# Patient Record
Sex: FEMALE | Race: WHITE | Employment: OTHER | URBAN - METROPOLITAN AREA
[De-identification: names, ages, dates, MRNs, and addresses within clinical notes are randomized per-mention and may not be internally consistent; named-entity substitution may affect disease eponyms.]

---

## 2017-02-06 DIAGNOSIS — E78.2 MIXED HYPERLIPIDEMIA: Primary | ICD-10-CM

## 2017-02-06 NOTE — TELEPHONE ENCOUNTER
Pravastatin     Last Written Prescription Date: 11/24/15  Last Fill Quantity: 90, # refills: 3  Last Office Visit with G, P or [facility] prescribing provider: 09/15/16       CHOL      176   9/15/2016  HDL       66   9/15/2016  LDL       96   9/15/2016  TRIG       71   9/15/2016  CHOLHDLRATIO      3.2   9/3/2015    Labs showing if normal/abnormal  Lab Results   Component Value Date    CHOL 176 09/15/2016    TRIG 71 09/15/2016    HDL 66 09/15/2016    LDL 96 09/15/2016    VLDL 15 09/03/2015    CHOLHDLRATIO 3.2 09/03/2015

## 2017-02-08 RX ORDER — PRAVASTATIN SODIUM 10 MG
10 TABLET ORAL DAILY
Qty: 90 TABLET | Refills: 2 | Status: SHIPPED | OUTPATIENT
Start: 2017-02-08 | End: 2017-10-25

## 2017-08-07 ENCOUNTER — HOSPITAL ENCOUNTER (OUTPATIENT)
Dept: MAMMOGRAPHY | Facility: CLINIC | Age: 67
Discharge: HOME | End: 2017-08-07
Attending: INTERNAL MEDICINE | Admitting: INTERNAL MEDICINE
Payer: MEDICARE

## 2017-08-07 DIAGNOSIS — Z12.31 VISIT FOR SCREENING MAMMOGRAM: ICD-10-CM

## 2017-08-07 PROCEDURE — 77063 BREAST TOMOSYNTHESIS BI: CPT

## 2017-08-11 ENCOUNTER — OFFICE VISIT (OUTPATIENT)
Dept: OBGYN | Facility: CLINIC | Age: 67
End: 2017-08-11
Payer: MEDICARE

## 2017-08-11 VITALS
HEIGHT: 65 IN | BODY MASS INDEX: 22.46 KG/M2 | SYSTOLIC BLOOD PRESSURE: 114 MMHG | WEIGHT: 134.8 LBS | DIASTOLIC BLOOD PRESSURE: 70 MMHG | HEART RATE: 68 BPM

## 2017-08-11 DIAGNOSIS — Z01.419 ENCOUNTER FOR GYNECOLOGICAL EXAMINATION WITHOUT ABNORMAL FINDING: Primary | ICD-10-CM

## 2017-08-11 PROCEDURE — G0101 CA SCREEN;PELVIC/BREAST EXAM: HCPCS | Performed by: OBSTETRICS & GYNECOLOGY

## 2017-08-11 ASSESSMENT — PATIENT HEALTH QUESTIONNAIRE - PHQ9
SUM OF ALL RESPONSES TO PHQ QUESTIONS 1-9: 0
5. POOR APPETITE OR OVEREATING: NOT AT ALL

## 2017-08-11 ASSESSMENT — ANXIETY QUESTIONNAIRES
2. NOT BEING ABLE TO STOP OR CONTROL WORRYING: NOT AT ALL
GAD7 TOTAL SCORE: 0
5. BEING SO RESTLESS THAT IT IS HARD TO SIT STILL: NOT AT ALL
7. FEELING AFRAID AS IF SOMETHING AWFUL MIGHT HAPPEN: NOT AT ALL
6. BECOMING EASILY ANNOYED OR IRRITABLE: NOT AT ALL
3. WORRYING TOO MUCH ABOUT DIFFERENT THINGS: NOT AT ALL
1. FEELING NERVOUS, ANXIOUS, OR ON EDGE: NOT AT ALL

## 2017-08-11 NOTE — PROGRESS NOTES
Skye is a 67 year old  female who presents for Medicare Limited exam.     Do you have a Health Care Directive?: Yes: Patient states has Advance Directive and will bring in a copy to clinic.    Fall risk:   Fallen 2 or more times in the past year?: No  Any fall with injury in the past year?: No      HPI :  Sees Chuy Bangura for PCP.  No GYN issues    GYNECOLOGIC HISTORY:Patient's last menstrual period was 2010..   reports that she has never smoked. She has never used smokeless tobacco.      STD testing offered?  Declined  Last PHQ-9 score on record=   PHQ-9 SCORE 2017   Total Score 0     Last GAD7 score on record=   JITENDRA-7 SCORE 2013   Total Score 15 0 -   Total Score - - 0       HEALTH MAINTENANCE:  Cholesterol: 9/15/16   Total= 176, Triglycerides=71, HDL=66, LDL=96, RUH=267, TSH=1.44 -had done elsewhere  Last Mammo: 17 @ [facility], Result: normal, Next Mammo: 2018   Pap: 13 neg, HPV-  DEXA: 07. States medicare does not cover  Colonoscopy:  08, Result:  normal, Next Colonoscopy: due next year    HISTORY:  Obstetric History       T0      L0     SAB0   TAB0   Ectopic0   Multiple0   Live Births0         Past Medical History:   Diagnosis Date     Arthritis      DUB (dysfunctional uterine bleeding)     Has disordered proliferative endometrium on bx.     Essential hypertension, benign      Hyperlipidemia      Hypertension 1\1\     Menorrhagia      Palpitations      PONV (postoperative nausea and vomiting)      TIA (transient ischaemic attack) 2013     Vertigo      Past Surgical History:   Procedure Laterality Date     C NONSPECIFIC PROCEDURE      D + C several times for uterine polyp     COLONOSCOPY       GYN SURGERY      D+C     GYN SURGERY       ORTHOPEDIC SURGERY       OSTEOTOMY FOOT  2012    Procedure: OSTEOTOMY FOOT;  LEFT DISTAL FIRST METATARSAL OSTEOTOMY WITH LISA REPAIR, LEFT SECOND METATARSAL WEIL OSTEOTOMY, LEFT SECOND  HAMMERTOE CORRECTION;  Surgeon: Henry Morelos DPM;  Location: [facility]     REPAIR HAMMER TOE  2012    Procedure: REPAIR HAMMER TOE;  LEFT SECOND HAMMER TOE CORRECTION;  Surgeon: Henry Morelos DPM;  Location: [facility]     Family History   Problem Relation Age of Onset     Hyperlipidemia Mother      CEREBROVASCULAR DISEASE Mother       81 yo 5 strokes and possible MI     Fractures Mother      HIP     Hypertension Mother      Thyroid Disease Mother      HEART DISEASE Father       age 87. MI age 55 and bladder cancer     CANCER Father      bladder cancer     Other Cancer Father      Bladder     CANCER Sister       51 yo- breast cancer     Breast Cancer Sister      Uterine Cancer Other      Social History     Social History     Marital status:      Spouse name: Olegario     Number of children: 0     Years of education: N/A     Occupational History      Retired     Social History Main Topics     Smoking status: Never Smoker     Smokeless tobacco: Never Used      Comment: smoked for 8 months     Alcohol use 0.0 oz/week     0 Standard drinks or equivalent per week      Comment: one mixed drink daily     Drug use: No     Sexual activity: Yes     Partners: Male     Other Topics Concern     Parent/Sibling W/ Cabg, Mi Or Angioplasty Before 65f 55m? Yes     Father 54?     Social History Narrative    Walks about 45-60 minutes, nearly every day.      Current Outpatient Prescriptions   Medication Sig     pravastatin (PRAVACHOL) 10 MG tablet Take 1 tablet (10 mg) by mouth daily     hydrochlorothiazide (HYDRODIURIL) 25 MG tablet Take 0.5 tablets (12.5 mg) by mouth daily     amLODIPine (NORVASC) 5 MG tablet Take 1 tablet (5 mg) by mouth daily     ondansetron (ZOFRAN) 4 MG tablet Take 1-2 tablets (4-8 mg) by mouth every 8 hours as needed for nausea     aspirin 81 MG tablet Take 1 tablet (81 mg) by mouth daily     meclizine (ANTIVERT) 25 MG tablet Take 1 tablet (25 mg) by mouth every 6 hours as  "needed     No current facility-administered medications for this visit.      No Known Allergies    Past medical, surgical, social and family history were reviewed and updated in [facility].    EXAM:  /70  Pulse 68  Ht 5' 5\" (1.651 m)  Wt 134 lb 12.8 oz (61.1 kg)  LMP 08/25/2010  BMI 22.43 kg/m2   BMI: Body mass index is 22.43 kg/(m^2).    Constitutional: Appearance: Well nourished, well developed alert, in no acute distress  Breasts: Inspection of Breasts:  No lymphadenopathy present    Palpation of Breasts and Axillae:  No masses present on palpation, no  breast tenderness    Axillary Lymph Nodes:  No lymphadenopathy present  Neurologic/Psychiatric:    Mental Status:  Oriented X3     Pelvic Exam:  External Genitalia:     Normal appearance for age, no discharge present, no tenderness present, no inflammatory lesions present, color normal  Vagina:     Normal vaginal vault without central or paravaginal defects, ATROPHIC  Bladder:     Nontender to palpation  Urethra:   Urethral Body:  Urethra palpation normal, urethra structural support normal   Urethral Meatus:  No erythema or lesions present  Cervix:     Appearance healthy, no lesions present, nontender to palpation, no bleeding present  Uterus:     Nontender to palpation, no masses present, position anteflexed, mobility: normal  Adnexa:     No adnexal tenderness present, no adnexal masses present  Perineum:     Perineum within normal limits, no evidence of trauma, no rashes or skin lesions present  Inguinal Lymph Nodes:     No lymphadenopathy present      Body mass index is 22.43 kg/(m^2).     reports that she has never smoked. She has never used smokeless tobacco.        ASSESSMENT:  67 year old female with satisfactory annual exam.  ICD-10-CM    1. Encounter for gynecological examination without abnormal finding Z01.419 Medicare Limited Visit       COUNSELING:   Reviewed preventive health counseling, as reflected in patient instructions       Regular " exercise    PLAN/PATIENT INSTRUCTIONS:  RTC 2 years    Clay Lange MD

## 2017-08-11 NOTE — MR AVS SNAPSHOT
After Visit Summary   8/11/2017    Skye Hewitt    MRN: [id number]           Patient Information     Date Of Birth          1950        Visit Information        Provider Department      8/11/2017 10:00 AM Clay Lange MD [facility]        Today's Diagnoses     Encounter for gynecological examination without abnormal finding    -  1       Follow-ups after your visit        Your next 10 appointments already scheduled     Oct 25, 2017  8:20 AM CDT   PHYSICAL with Chuy Bangura MD   [facility] ([facility])    [address]  [facility] [id number] [phone]              Who to contact     If you have questions or need follow up information about today's clinic visit or your schedule please contact [facility] directly at [phone].  Normal or non-critical lab and imaging results will be communicated to you by MyChart, letter or phone within 4 business days after the clinic has received the results. If you do not hear from us within 7 days, please contact the clinic through MyChart or phone. If you have a critical or abnormal lab result, we will notify you by phone as soon as possible.  Submit refill requests through [employer] or call your pharmacy and they will forward the refill request to us. Please allow 3 business days for your refill to be completed.          Additional Information About Your Visit        MyChart Information     [employer]aclip gives you secure access to your electronic health record. If you see a primary care provider, you can also send messages to your care team and make appointments. If you have questions, please call your primary care clinic.  If you do not have a primary care provider, please call [phone] and they will assist you.        Care EveryWhere ID     This is your Care EveryWhere ID. This could be used by other organizations to access your [facility]  "records  [id number]        Your Vitals Were     Pulse Height Last Period BMI (Body Mass Index)          68 5' 5\" (1.651 m) 08/25/2010 22.43 kg/m2         Blood Pressure from Last 3 Encounters:   08/11/17 114/70   09/15/16 134/64   06/14/16 120/80    Weight from Last 3 Encounters:   08/11/17 134 lb 12.8 oz (61.1 kg)   09/15/16 143 lb (64.9 kg)   06/14/16 143 lb (64.9 kg)              We Performed the Following     Medicare Limited Visit        Primary Care Provider Office Phone # Fax #    Chuy Bangura [phone]       [address] [place], [place] [address]        Equal Access to Services     STEF GARG : Hadii prem lynn hadasho Soomaali, waaxda luqadaha, qaybta kaalmada adeegyada, margi mathewin hayrichie parada . So [facility] [phone].    ATENCIÓN: Si habla español, tiene a waller disposición servicios gratuitos de asistencia lingüística. Llame al [phone].    We comply with applicable federal civil rights laws and [place] laws. We do not discriminate on the basis of race, color, national origin, age, disability sex, sexual orientation or gender identity.            Thank you!     Thank you for choosing [facility]  for your care. Our goal is always to provide you with excellent care. Hearing back from our patients is one way we can continue to improve our services. Please take a few minutes to complete the written survey that you may receive in the mail after your visit with us. Thank you!             Your Updated Medication List - Protect others around you: Learn how to safely use, store and throw away your medicines at www.disposemymeds.org.          This list is accurate as of: 8/11/17 10:23 AM.  Always use your most recent med list.                   Brand Name Dispense Instructions for use Diagnosis    amLODIPine 5 MG tablet    NORVASC    90 tablet    Take 1 tablet (5 mg) by mouth daily    Essential hypertension, benign       aspirin 81 MG tablet     30 " tablet    Take 1 tablet (81 mg) by mouth daily    Essential hypertension, benign       hydrochlorothiazide 25 MG tablet    HYDRODIURIL    90 tablet    Take 0.5 tablets (12.5 mg) by mouth daily    Essential hypertension, benign       meclizine 25 MG tablet    ANTIVERT    30 tablet    Take 1 tablet (25 mg) by mouth every 6 hours as needed    Vertigo       ondansetron 4 MG tablet    ZOFRAN    18 tablet    Take 1-2 tablets (4-8 mg) by mouth every 8 hours as needed for nausea    Nausea       pravastatin 10 MG tablet    PRAVACHOL    90 tablet    Take 1 tablet (10 mg) by mouth daily    Mixed hyperlipidemia

## 2017-08-12 ASSESSMENT — ANXIETY QUESTIONNAIRES: GAD7 TOTAL SCORE: 0

## 2017-10-25 ENCOUNTER — OFFICE VISIT (OUTPATIENT)
Dept: INTERNAL MEDICINE | Facility: CLINIC | Age: 67
End: 2017-10-25
Payer: MEDICARE

## 2017-10-25 VITALS
TEMPERATURE: 97.5 F | BODY MASS INDEX: 22.66 KG/M2 | SYSTOLIC BLOOD PRESSURE: 118 MMHG | OXYGEN SATURATION: 99 % | HEIGHT: 65 IN | DIASTOLIC BLOOD PRESSURE: 74 MMHG | HEART RATE: 76 BPM | WEIGHT: 136 LBS

## 2017-10-25 DIAGNOSIS — I10 ESSENTIAL HYPERTENSION, BENIGN: ICD-10-CM

## 2017-10-25 DIAGNOSIS — H69.90 DYSFUNCTION OF EUSTACHIAN TUBE, UNSPECIFIED LATERALITY: ICD-10-CM

## 2017-10-25 DIAGNOSIS — H91.93 BILATERAL HEARING LOSS, UNSPECIFIED HEARING LOSS TYPE: ICD-10-CM

## 2017-10-25 DIAGNOSIS — E78.5 HYPERLIPIDEMIA LDL GOAL <70: ICD-10-CM

## 2017-10-25 DIAGNOSIS — E78.2 MIXED HYPERLIPIDEMIA: ICD-10-CM

## 2017-10-25 DIAGNOSIS — L82.1 SEBORRHEIC KERATOSES: ICD-10-CM

## 2017-10-25 DIAGNOSIS — Z00.00 ROUTINE GENERAL MEDICAL EXAMINATION AT A HEALTH CARE FACILITY: Primary | ICD-10-CM

## 2017-10-25 LAB
ALBUMIN SERPL-MCNC: 4 G/DL (ref 3.4–5)
ALP SERPL-CCNC: 57 U/L (ref 40–150)
ALT SERPL W P-5'-P-CCNC: 20 U/L (ref 0–50)
ANION GAP SERPL CALCULATED.3IONS-SCNC: 9 MMOL/L (ref 3–14)
AST SERPL W P-5'-P-CCNC: 16 U/L (ref 0–45)
BILIRUB SERPL-MCNC: 0.5 MG/DL (ref 0.2–1.3)
BUN SERPL-MCNC: 18 MG/DL (ref 7–30)
CALCIUM SERPL-MCNC: 9.6 MG/DL (ref 8.5–10.1)
CHLORIDE SERPL-SCNC: 103 MMOL/L (ref 94–109)
CHOLEST SERPL-MCNC: 197 MG/DL
CO2 SERPL-SCNC: 27 MMOL/L (ref 20–32)
CREAT SERPL-MCNC: 0.87 MG/DL (ref 0.52–1.04)
ERYTHROCYTE [DISTWIDTH] IN BLOOD BY AUTOMATED COUNT: 12.7 % (ref 10–15)
GFR SERPL CREATININE-BSD FRML MDRD: 65 ML/MIN/1.7M2
GLUCOSE SERPL-MCNC: 115 MG/DL (ref 70–99)
HCT VFR BLD AUTO: 42.9 % (ref 35–47)
HDLC SERPL-MCNC: 88 MG/DL
HGB BLD-MCNC: 14.4 G/DL (ref 11.7–15.7)
LDLC SERPL CALC-MCNC: 96 MG/DL
MCH RBC QN AUTO: 30.6 PG (ref 26.5–33)
MCHC RBC AUTO-ENTMCNC: 33.6 G/DL (ref 31.5–36.5)
MCV RBC AUTO: 91 FL (ref 78–100)
NONHDLC SERPL-MCNC: 109 MG/DL
PLATELET # BLD AUTO: 274 10E9/L (ref 150–450)
POTASSIUM SERPL-SCNC: 3.7 MMOL/L (ref 3.4–5.3)
PROT SERPL-MCNC: 7 G/DL (ref 6.8–8.8)
RBC # BLD AUTO: 4.7 10E12/L (ref 3.8–5.2)
SODIUM SERPL-SCNC: 139 MMOL/L (ref 133–144)
TRIGL SERPL-MCNC: 64 MG/DL
TSH SERPL DL<=0.005 MIU/L-ACNC: 1.86 MU/L (ref 0.4–4)
WBC # BLD AUTO: 5.7 10E9/L (ref 4–11)

## 2017-10-25 PROCEDURE — 99213 OFFICE O/P EST LOW 20 MIN: CPT | Mod: 25 | Performed by: INTERNAL MEDICINE

## 2017-10-25 PROCEDURE — G0439 PPPS, SUBSEQ VISIT: HCPCS | Performed by: INTERNAL MEDICINE

## 2017-10-25 PROCEDURE — 80053 COMPREHEN METABOLIC PANEL: CPT | Performed by: INTERNAL MEDICINE

## 2017-10-25 PROCEDURE — 85027 COMPLETE CBC AUTOMATED: CPT | Performed by: INTERNAL MEDICINE

## 2017-10-25 PROCEDURE — 84443 ASSAY THYROID STIM HORMONE: CPT | Performed by: INTERNAL MEDICINE

## 2017-10-25 PROCEDURE — 80061 LIPID PANEL: CPT | Performed by: INTERNAL MEDICINE

## 2017-10-25 PROCEDURE — 36415 COLL VENOUS BLD VENIPUNCTURE: CPT | Performed by: INTERNAL MEDICINE

## 2017-10-25 RX ORDER — AMLODIPINE BESYLATE 5 MG/1
5 TABLET ORAL DAILY
Qty: 90 TABLET | Refills: 3 | Status: SHIPPED | OUTPATIENT
Start: 2017-10-25

## 2017-10-25 RX ORDER — AMOXICILLIN 500 MG
1200 CAPSULE ORAL DAILY
COMMUNITY

## 2017-10-25 RX ORDER — HYDROCHLOROTHIAZIDE 25 MG/1
12.5 TABLET ORAL DAILY
Qty: 90 TABLET | Refills: 3 | Status: SHIPPED | OUTPATIENT
Start: 2017-10-25

## 2017-10-25 RX ORDER — PRAVASTATIN SODIUM 10 MG
10 TABLET ORAL DAILY
Qty: 90 TABLET | Refills: 3 | Status: SHIPPED | OUTPATIENT
Start: 2017-10-25

## 2017-10-25 NOTE — MR AVS SNAPSHOT
After Visit Summary   10/25/2017    Skye Hewitt    MRN: [id number]           Patient Information     Date Of Birth          1950        Visit Information        Provider Department      10/25/2017 8:20 AM Chuy Bangura MD [facility]        Today's Diagnoses     Routine general medical examination at a health care facility    -  1    Hyperlipidemia LDL goal <70        Essential hypertension, benign        Mixed hyperlipidemia        Bilateral hearing loss, unspecified hearing loss type        Dysfunction of Eustachian tube, unspecified laterality        Seborrheic keratoses          Care Instructions      Preventive Health Recommendations    Female Ages 65 +    Yearly exam:     See your health care provider every year in order to  o Review health changes.   o Discuss preventive care.    o Review your medicines if your doctor has prescribed any.      You no longer need a yearly Pap test unless you've had an abnormal Pap test in the past 10 years. If you have vaginal symptoms, such as bleeding or discharge, be sure to talk with your provider about a Pap test.      Every 1 to 2 years, have a mammogram.  If you are over 69, talk with your health care provider about whether or not you want to continue having screening mammograms.      Every 10 years, have a colonoscopy. Or, have a yearly FIT test (stool test). These exams will check for colon cancer.       Have a cholesterol test every 5 years, or more often if your doctor advises it.       Have a diabetes test (fasting glucose) every three years. If you are at risk for diabetes, you should have this test more often.       At age 65, have a bone density scan (DEXA) to check for osteoporosis (brittle bone disease).    Shots:    Get a flu shot each year.    Get a tetanus shot every 10 years.    Talk to your doctor about your pneumonia vaccines. There are now two you should receive - Pneumovax (PPSV 23) and Prevnar (PCV  13).    Talk to your doctor about the shingles vaccine.    Talk to your doctor about the hepatitis B vaccine.    Nutrition:     Eat at least 5 servings of fruits and vegetables each day.      Eat whole-grain bread, whole-wheat pasta and brown rice instead of white grains and rice.      Talk to your provider about Calcium and Vitamin D.     Lifestyle    Exercise at least 150 minutes a week (30 minutes a day, 5 days a week). This will help you control your weight and prevent disease.      Limit alcohol to one drink per day.      No smoking.       Wear sunscreen to prevent skin cancer.       See your dentist twice a year for an exam and cleaning.      See your eye doctor every 1 to 2 years to screen for conditions such as glaucoma, macular degeneration and cataracts.      Consider over the counter antihistamine (eg, Zyrtec, Claritin, or Allegra) as needed for nasal congestion. Another alternative would be a nasal steroid inhaler (like Flonase)--needs to be used daily for benefit.     Everything looks fine!    Refills of medications have been faxed to your pharmacy.     I'll get back to you with lab results soon, especially if there is anything of concern.      See you in a year, sooner if problems.            Follow-ups after your visit        Additional Services     DERMATOLOGY REFERRAL       Your provider has referred you to: [facility]: [facility] [place] [phone]  [facility]: [facility] [place] [phone]   [email or web address]  [facility] [place] [phone]   [email or web address]  [place] [phone]   [email or web address]  [facility] [place] [phone]  [email or web address]    Please be aware that coverage of these services is subject to the terms and limitations of your health insurance plan.  Call member services at your health plan with any benefit or coverage  questions.      Please bring the following with you to your appointment:    (1) Any X-Rays, CTs or MRIs which have been performed.  Contact the facility where they were done to arrange for  prior to your scheduled appointment.    (2) List of current medications  (3) This referral request   (4) Any documents/labs given to you for this referral            OTOLARYNGOLOGY REFERRAL       Your provider has referred you to: [facility]: [facility] [phone]   [email or web address]  [facility]: [facility] [place] [phone]   [email or web address]    Please be aware that coverage of these services is subject to the terms and limitations of your health insurance plan.  Call member services at your health plan with any benefit or coverage questions.      Please bring the following with you to your appointment:    (1) Any X-Rays, CTs or MRIs which have been performed.  Contact the facility where they were done to arrange for  prior to your scheduled appointment.   (2) List of current medications  (3) This referral request   (4) Any documents/labs given to you for this referral                  Who to contact     If you have questions or need follow up information about today's clinic visit or your schedule please contact [facility] directly at [phone].  Normal or non-critical lab and imaging results will be communicated to you by MyChart, letter or phone within 4 business days after the clinic has received the results. If you do not hear from us within 7 days, please contact the clinic through MyChart or phone. If you have a critical or abnormal lab result, we will notify you by phone as soon as possible.  Submit refill requests through [employer] or call your pharmacy and they will forward the refill request to us. Please allow 3 business days for your refill to be completed.        "   Additional Information About Your Visit        MyChart Information     [employer] gives you secure access to your electronic health record. If you see a primary care provider, you can also send messages to your care team and make appointments. If you have questions, please call your primary care clinic.  If you do not have a primary care provider, please call [phone] and they will assist you.        Care EveryWhere ID     This is your Care EveryWhere ID. This could be used by other organizations to access your [place] medical records  [id number]        Your Vitals Were     Pulse Temperature Height Last Period Pulse Oximetry Breastfeeding?    76 97.5  F (36.4  C) (Oral) 5' 5\" (1.651 m) 08/25/2010 99% No    BMI (Body Mass Index)                   22.63 kg/m2            Blood Pressure from Last 3 Encounters:   10/25/17 118/74   08/11/17 114/70   09/15/16 134/64    Weight from Last 3 Encounters:   10/25/17 136 lb (61.7 kg)   08/11/17 134 lb 12.8 oz (61.1 kg)   09/15/16 143 lb (64.9 kg)              We Performed the Following     CBC with platelets     Comprehensive metabolic panel     DERMATOLOGY REFERRAL     Lipid panel reflex to direct LDL Fasting     OTOLARYNGOLOGY REFERRAL     TSH with free T4 reflex          Where to get your medicines      [facility] [place] [place] - [address] AT [address] [facility]  [address] [facility] [address]     Phone:  [phone]     amLODIPine 5 MG tablet    hydrochlorothiazide 25 MG tablet    pravastatin 10 MG tablet          Primary Care Provider Office Phone # Fax #    Chuy Bangura [phone]       [address] [facility] [address]  [facility] [address]        Equal Access to Services     RADHA GARG : Fabian Mack, wamira dempsey, qaybta katyree contreras, margi kimball. So [facility] [phone].    ATENCIÓN: Si stefaniela esparon, kim a waller " disposición servicios gratuitos de asistencia lingüística. Rhea hernandez [phone].    We comply with applicable federal civil rights laws and [place] laws. We do not discriminate on the basis of race, color, national origin, age, disability, sex, sexual orientation, or gender identity.            Thank you!     Thank you for choosing [facility]  for your care. Our goal is always to provide you with excellent care. Hearing back from our patients is one way we can continue to improve our services. Please take a few minutes to complete the written survey that you may receive in the mail after your visit with us. Thank you!             Your Updated Medication List - Protect others around you: Learn how to safely use, store and throw away your medicines at www.disposemymeds.org.          This list is accurate as of: 10/25/17  9:00 AM.  Always use your most recent med list.                   Brand Name Dispense Instructions for use Diagnosis    amLODIPine 5 MG tablet    NORVASC    90 tablet    Take 1 tablet (5 mg) by mouth daily    Essential hypertension, benign       aspirin 81 MG tablet     30 tablet    Take 1 tablet (81 mg) by mouth daily    Essential hypertension, benign       fish Oil 1200 MG capsule      Take 1,200 mg by mouth daily        hydrochlorothiazide 25 MG tablet    HYDRODIURIL    90 tablet    Take 0.5 tablets (12.5 mg) by mouth daily    Essential hypertension, benign       meclizine 25 MG tablet    ANTIVERT    30 tablet    Take 1 tablet (25 mg) by mouth every 6 hours as needed    Vertigo       ondansetron 4 MG tablet    ZOFRAN    18 tablet    Take 1-2 tablets (4-8 mg) by mouth every 8 hours as needed for nausea    Nausea       pravastatin 10 MG tablet    PRAVACHOL    90 tablet    Take 1 tablet (10 mg) by mouth daily    Mixed hyperlipidemia

## 2017-10-25 NOTE — PROGRESS NOTES
SUBJECTIVE:   Skye Hewitt is a 67 year old female who presents for Preventive Visit.    Are you in the first 12 months of your Medicare Part B coverage?  No    Healthy Habits:  Answers for HPI/ROS submitted by the patient on 10/24/2017   Annual Exam:  Getting at least 3 servings of Calcium per day:: Yes  Bi-annual eye exam:: Yes  Dental care twice a year:: Yes  Sleep apnea or symptoms of sleep apnea:: None  Frequency of exercise:: 4-5 days/week  Diet:: Regular (no restrictions)  Taking medications regularly:: Yes  Medication side effects:: Muscle aches  Additional concerns today:: YES  PHQ-2 Score: 0  Duration of exercise:: 45-60 minutes    COGNITIVE SCREEN  1) Repeat 3 items (Banana, Sunrise, Chair)    2) Clock draw: NORMAL  3) 3 item recall: Recalls 3 objects  Results: 3 items recalled: COGNITIVE IMPAIRMENT LESS LIKELY    Mini-CogTM Copyright S Orestes. Licensed by the author for use in [facility] [employer]; reprinted with permission (sojose@[facility]). All rights reserved.      Past/recent records reviewed and discussed for:  -Patient went to Dr. Lange for mammograms. She will no longer need to follow with him. Last mammogram was 8/7/2017  -Last colonoscopy was 11/6/2008. Due 2018.   -She continues to walk outside daily.     Hyperlipidemia   Patient reports muscle aches with pravastatin, but states that they are tolerable. She experienced bad muscle aches with simvastatin.     Right ear pain  Patient reports experiencing sinus congestion and pain below the right ear that extends to the neck. She notices the ear pain after having a cold and describes it as a throbbing pain. Last week she was up all night due to the ear pain. She has not taken any medication to alleviate symptoms. She is not prone to sinus congestion all year and describes it as a seasonal affect.     Right lower leg pain  She reports that the right lower leg pain is not brought on by walking and she only notices the pain at night.      Reviewed and updated as needed this visit by clinical staffTobacco  Allergies  Meds  Med Hx  Surg Hx  Fam Hx  Soc Hx        Reviewed and updated as needed this visit by Provider        Social History   Substance Use Topics     Smoking status: Never Smoker     Smokeless tobacco: Never Used      Comment: smoked for 8 months     Alcohol use 0.0 oz/week     0 Standard drinks or equivalent per week      Comment: one mixed drink daily     The patient does not drink >3 drinks per day nor >7 drinks per week.    Today's PHQ-2 Score:   PHQ-2 ( 1999 [employer]) 10/24/2017 9/15/2016   Q1: Little interest or pleasure in doing things 0 0   Q2: Feeling down, depressed or hopeless 0 0   PHQ-2 Score 0 0   Q1: Little interest or pleasure in doing things Not at all -   Q2: Feeling down, depressed or hopeless Not at all -   PHQ-2 Score 0 -     Do you feel safe in your environment - Yes    Do you have a Health Care Directive?: Yes: Advance Directive has been received and scanned.    Current providers sharing in care for this patient include: Patient Care Team:  Chuy Bangura MD as PCP - General (Internal Medicine)      Hearing impairment: Yes,     Ability to successfully perform activities of daily living: Yes, no assistance needed     Fall risk:       Home safety:  none identified    The following health maintenance items are reviewed in Epic and correct as of today:Health Maintenance   Topic Date Due     EYE EXAM Q1 YEAR  03/05/1951     HEPATITIS C SCREENING  03/05/1968     DEXA SCAN SCREENING (SYSTEM ASSIGNED)  03/05/2015     FOOT EXAM Q1 YEAR  05/14/2015     MICROALBUMIN Q1 YEAR  10/06/2015     A1C Q6 MO  03/03/2016     ADVANCE DIRECTIVE PLANNING Q5 YRS  09/21/2016     CREATININE Q1 YEAR  09/15/2017     LIPID MONITORING Q1 YEAR  09/15/2017     PNEUMOCOCCAL (2 of 2 - PPSV23) 01/01/2018     MAMMO Q1 YR  08/07/2018     FALL RISK ASSESSMENT  08/11/2018     TSH W/ FREE T4 REFLEX Q2 YEAR  09/15/2018     COLON CANCER SCREEN  "(SYSTEM ASSIGNED)  11/06/2018     TETANUS IMMUNIZATION (SYSTEM ASSIGNED)  07/29/2019     INFLUENZA VACCINE (SYSTEM ASSIGNED)  Addressed     ROS:  C: NEGATIVE for fever, chills, change in weight  I: NEGATIVE for worrisome rashes, moles or lesions  E: NEGATIVE for vision changes or irritation  E/M: POSITIVE for sinus congestion and right ear pain NEGATIVE for mouth and throat problems  R: NEGATIVE for significant cough or SOB  B: NEGATIVE for masses, tenderness or discharge  CV: NEGATIVE for chest pain, palpitations or peripheral edema  GI: NEGATIVE for nausea, abdominal pain, heartburn, or change in bowel habits  : NEGATIVE for frequency, dysuria, or hematuria  M: POSITIVE for right lower leg pain NEGATIVE for significant arthralgias or myalgia  N: NEGATIVE for weakness, dizziness or paresthesias  E: NEGATIVE for temperature intolerance, skin/hair changes  H: NEGATIVE for bleeding problems  P: NEGATIVE for changes in mood or affect    This document serves as a record of the services and decisions personally performed and made by Chuy Bangura MD. It was created on his behalf by Alyssa Blevins, a trained medical scribe. The creation of this document is based on the provider's statements to the medical scribe.  Alyssa Blevins October 25, 2017 8:35 AM     OBJECTIVE:   /74 (BP Location: Right arm, Patient Position: Sitting, Cuff Size: Adult Large)  Pulse 76  Temp 97.5  F (36.4  C) (Oral)  Ht 5' 5\" (1.651 m)  Wt 136 lb (61.7 kg)  LMP 08/25/2010  SpO2 99%  Breastfeeding? No  BMI 22.63 kg/m2 Estimated body mass index is 22.63 kg/(m^2) as calculated from the following:    Height as of this encounter: 5' 5\" (1.651 m).    Weight as of this encounter: 136 lb (61.7 kg).    EXAM:   GENERAL APPEARANCE: healthy, alert and no distress  EYES: Eyes grossly normal to inspection, PERRL and conjunctivae and sclerae normal  HENT: ear canals and TM's normal, nose and mouth without ulcers or lesions, oropharynx clear and " oral mucous membranes moist  NECK: no adenopathy, no asymmetry, masses, or scars and thyroid normal to palpation  RESP: lungs clear to auscultation - no rales, rhonchi or wheezes  CV: regular rate and rhythm, normal S1 S2, no S3 or S4, no murmur, click or rub, no peripheral edema and peripheral pulses strong  ABDOMEN: soft, nontender, no hepatosplenomegaly, no masses and bowel sounds normal  MS: no musculoskeletal defects are noted and gait is age appropriate without ataxia  SKIN: no suspicious lesions or rashes. Seborrheic keratosis noted on the right temple, several also on the back.  NEURO: Normal strength and tone, sensory exam grossly normal, mentation intact and speech normal  PSYCH: mentation appears normal and affect normal/bright    BREAST//RECTAL not performed    ASSESSMENT / PLAN:   (Z00.00) Routine general medical examination at a health care facility  (primary encounter diagnosis)  Comment: Stable health. See epic orders.  Plan: Comprehensive metabolic panel, Lipid panel         reflex to direct LDL Fasting, TSH with free T4         reflex, CBC with platelets          (E78.5) Hyperlipidemia LDL goal <70  Comment: Will perform lipid panel today. If within normal limits, continue current meds. Last LDL was 96 mg/dL.  Plan: Comprehensive metabolic panel, Lipid panel         reflex to direct LDL Fasting          (I10) Essential hypertension, benign  Comment: BP at target. Continue current meds.  Plan: hydrochlorothiazide (HYDRODIURIL) 25 MG tablet,        amLODIPine (NORVASC) 5 MG tablet          (E78.2) Mixed hyperlipidemia  Comment: Will perform lipid panel today. If within normal limits, continue current meds.   Plan: pravastatin (PRAVACHOL) 10 MG tablet          (H91.93) Bilateral hearing loss, unspecified hearing loss type  Comment: Referred to otolaryngology  Plan: OTOLARYNGOLOGY REFERRAL          (H69.80) Dysfunction of Eustachian tube, unspecified laterality  Comment: Recommend OTC Zyrtec or  "Claritin as needed for nasal congestion and using a nose inhaler daily. May also see otolaryngology if desired for the ear pain.   Plan: OTOLARYNGOLOGY REFERRAL          (L82.1) Seborrheic keratoses  Comment: Seborrheic keratosis on right temple. Referred to dermatology  Plan: DERMATOLOGY REFERRAL          Consider over the counter antihistamine (eg, Zyrtec, Claritin, or Allegra) as needed for nasal congestion. Another alternative would be a nasal steroid inhaler (like Flonase)--needs to be used daily for benefit.     Everything looks fine!    Refills of medications have been faxed to your pharmacy.     I'll get back to you with lab results soon, especially if there is anything of concern.      See you in a year, sooner if problems.      End of Life Planning:  Patient currently has an advanced directive: Yes.  Practitioner is supportive of decision.    COUNSELING:  Reviewed preventive health counseling, as reflected in patient instructions       Regular exercise       Healthy diet/nutrition       Colon cancer screening    Estimated body mass index is 22.63 kg/(m^2) as calculated from the following:    Height as of this encounter: 5' 5\" (1.651 m).    Weight as of this encounter: 136 lb (61.7 kg).  Weight management plan noted, stable and monitoring   reports that she has never smoked. She has never used smokeless tobacco.    Appropriate preventive services were discussed with this patient, including applicable screening as appropriate for cardiovascular disease, diabetes, osteopenia/osteoporosis, and glaucoma.  As appropriate for age/gender, discussed screening for colorectal cancer, prostate cancer, breast cancer, and cervical cancer. Checklist reviewing preventive services available has been given to the patient.    Reviewed patients plan of care and provided an AVS. The Basic Care Plan (routine screening as documented in Health Maintenance) for Skye meets the Care Plan requirement. This Care Plan has been " established and reviewed with the Patient.    Counseling Resources:  ATP IV Guidelines  Pooled Cohorts Equation Calculator  Breast Cancer Risk Calculator  FRAX Risk Assessment  ICSI Preventive Guidelines  Dietary Guidelines for Americans, 2010  USDA's MyPlate  ASA Prophylaxis  Lung CA Screening    The information in this document, created by the medical scribe for me, accurately reflects the services I personally performed and the decisions made by me. I have reviewed and approved this document for accuracy prior to leaving the patient care area.  October 25, 2017 8:35 AM    Chuy Bangura MD  [facility]

## 2017-10-25 NOTE — NURSING NOTE
"Chief Complaint   Patient presents with     Medicare Visit       Initial /74 (BP Location: Right arm, Patient Position: Sitting, Cuff Size: Adult Large)  Pulse 76  Temp 97.5  F (36.4  C) (Oral)  Ht 5' 5\" (1.651 m)  Wt 136 lb (61.7 kg)  LMP 08/25/2010  SpO2 99%  Breastfeeding? No  BMI 22.63 kg/m2 Estimated body mass index is 22.63 kg/(m^2) as calculated from the following:    Height as of this encounter: 5' 5\" (1.651 m).    Weight as of this encounter: 136 lb (61.7 kg).  Medication Reconciliation: complete   Ede VARGAS      "

## 2017-10-25 NOTE — PATIENT INSTRUCTIONS
Preventive Health Recommendations    Female Ages 65 +    Yearly exam:     See your health care provider every year in order to  o Review health changes.   o Discuss preventive care.    o Review your medicines if your doctor has prescribed any.      You no longer need a yearly Pap test unless you've had an abnormal Pap test in the past 10 years. If you have vaginal symptoms, such as bleeding or discharge, be sure to talk with your provider about a Pap test.      Every 1 to 2 years, have a mammogram.  If you are over 69, talk with your health care provider about whether or not you want to continue having screening mammograms.      Every 10 years, have a colonoscopy. Or, have a yearly FIT test (stool test). These exams will check for colon cancer.       Have a cholesterol test every 5 years, or more often if your doctor advises it.       Have a diabetes test (fasting glucose) every three years. If you are at risk for diabetes, you should have this test more often.       At age 65, have a bone density scan (DEXA) to check for osteoporosis (brittle bone disease).    Shots:    Get a flu shot each year.    Get a tetanus shot every 10 years.    Talk to your doctor about your pneumonia vaccines. There are now two you should receive - Pneumovax (PPSV 23) and Prevnar (PCV 13).    Talk to your doctor about the shingles vaccine.    Talk to your doctor about the hepatitis B vaccine.    Nutrition:     Eat at least 5 servings of fruits and vegetables each day.      Eat whole-grain bread, whole-wheat pasta and brown rice instead of white grains and rice.      Talk to your provider about Calcium and Vitamin D.     Lifestyle    Exercise at least 150 minutes a week (30 minutes a day, 5 days a week). This will help you control your weight and prevent disease.      Limit alcohol to one drink per day.      No smoking.       Wear sunscreen to prevent skin cancer.       See your dentist twice a year for an exam and cleaning.      See your  eye doctor every 1 to 2 years to screen for conditions such as glaucoma, macular degeneration and cataracts.      Consider over the counter antihistamine (eg, Zyrtec, Claritin, or Allegra) as needed for nasal congestion. Another alternative would be a nasal steroid inhaler (like Flonase)--needs to be used daily for benefit.     Everything looks fine!    Refills of medications have been faxed to your pharmacy.     I'll get back to you with lab results soon, especially if there is anything of concern.      See you in a year, sooner if problems.

## 2018-05-04 ENCOUNTER — TELEPHONE (OUTPATIENT)
Dept: INTERNAL MEDICINE | Facility: CLINIC | Age: 68
End: 2018-05-04

## 2018-05-04 ENCOUNTER — OFFICE VISIT (OUTPATIENT)
Dept: PEDIATRICS | Facility: CLINIC | Age: 68
End: 2018-05-04
Payer: MEDICARE

## 2018-05-04 VITALS
DIASTOLIC BLOOD PRESSURE: 66 MMHG | OXYGEN SATURATION: 97 % | HEART RATE: 71 BPM | WEIGHT: 134 LBS | TEMPERATURE: 98.1 F | HEIGHT: 65 IN | BODY MASS INDEX: 22.33 KG/M2 | SYSTOLIC BLOOD PRESSURE: 116 MMHG

## 2018-05-04 DIAGNOSIS — H65.03 BILATERAL ACUTE SEROUS OTITIS MEDIA, RECURRENCE NOT SPECIFIED: Primary | ICD-10-CM

## 2018-05-04 PROCEDURE — 99213 OFFICE O/P EST LOW 20 MIN: CPT | Performed by: PHYSICIAN ASSISTANT

## 2018-05-04 RX ORDER — AMOXICILLIN 875 MG
875 TABLET ORAL 2 TIMES DAILY
Qty: 20 TABLET | Refills: 0 | Status: SHIPPED | OUTPATIENT
Start: 2018-05-04

## 2018-05-04 NOTE — MR AVS SNAPSHOT
"              After Visit Summary   5/4/2018    Skye Hewitt    MRN: [id number]           Patient Information     Date Of Birth          1950        Visit Information        Provider Department      5/4/2018 12:50 PM Anil Herrmann PA-C [facility]        Today's Diagnoses     Bilateral acute serous otitis media, recurrence not specified    -  1       Follow-ups after your visit        Who to contact     If you have questions or need follow up information about today's clinic visit or your schedule please contact [facility] directly at [phone].  Normal or non-critical lab and imaging results will be communicated to you by [employer], letter or phone within 4 business days after the clinic has received the results. If you do not hear from us within 7 days, please contact the clinic through [employer] or phone. If you have a critical or abnormal lab result, we will notify you by phone as soon as possible.  Submit refill requests through [employer] or call your pharmacy and they will forward the refill request to us. Please allow 3 business days for your refill to be completed.          Additional Information About Your Visit        MyChart Information     [employer] gives you secure access to your electronic health record. If you see a primary care provider, you can also send messages to your care team and make appointments. If you have questions, please call your primary care clinic.  If you do not have a primary care provider, please call [phone] and they will assist you.        Care EveryWhere ID     This is your Care EveryWhere ID. This could be used by other organizations to access your [place] medical records  [id number]        Your Vitals Were     Pulse Temperature Height Last Period Pulse Oximetry BMI (Body Mass Index)    71 98.1  F (36.7  C) (Oral) 5' 5\" (1.651 m) 08/25/2010 97% 22.3 kg/m2       Blood Pressure from Last 3 Encounters:   05/04/18 116/66   10/25/17 " 118/74   08/11/17 114/70    Weight from Last 3 Encounters:   05/04/18 134 lb (60.8 kg)   10/25/17 136 lb (61.7 kg)   08/11/17 134 lb 12.8 oz (61.1 kg)              Today, you had the following     No orders found for display         Today's Medication Changes          These changes are accurate as of 5/4/18  1:30 PM.  If you have any questions, ask your nurse or doctor.               Start taking these medicines.        Dose/Directions    amoxicillin 875 MG tablet   Commonly known as:  AMOXIL   Used for:  Bilateral acute serous otitis media, recurrence not specified   Started by:  Anil Herrmann PA-C        Dose:  875 mg   Take 1 tablet (875 mg) by mouth 2 times daily   Quantity:  20 tablet   Refills:  0            Where to get your medicines      These medications were sent to [place] [address] [place] [facility] [address] [facility]  [address] [facility] [address]     Phone:  [phone]     amoxicillin 875 MG tablet                Primary Care Provider Office Phone # Fax #    Chuy Bangura [phone]       [address] [facility] [address]  [facility] [address]        Equal Access to Services     [facility]: Hadii prem lynn hadjonatano Sojeffery, waaxda luqadaha, qaybta kaalmada adeegyada, margi kimball. So [facility] [phone].    ATENCIÓN: Si habla español, tiene a waller disposición servicios gratuitos de asistencia lingüística. Llame al [phone].    We comply with applicable federal civil rights laws and [place] laws. We do not discriminate on the basis of race, color, national origin, age, disability, sex, sexual orientation, or gender identity.            Thank you!     Thank you for choosing [facility]  for your care. Our goal is always to provide you with excellent care. Hearing back from our patients is one way we can continue to improve our services. Please take a few minutes to complete the written survey that you may receive in the mail after  your visit with us. Thank you!             Your Updated Medication List - Protect others around you: Learn how to safely use, store and throw away your medicines at www.disposemymeds.org.          This list is accurate as of 5/4/18  1:30 PM.  Always use your most recent med list.                   Brand Name Dispense Instructions for use Diagnosis    amLODIPine 5 MG tablet    NORVASC    90 tablet    Take 1 tablet (5 mg) by mouth daily    Essential hypertension, benign       amoxicillin 875 MG tablet    AMOXIL    20 tablet    Take 1 tablet (875 mg) by mouth 2 times daily    Bilateral acute serous otitis media, recurrence not specified       aspirin 81 MG tablet     30 tablet    Take 1 tablet (81 mg) by mouth daily    Essential hypertension, benign       fish Oil 1200 MG capsule      Take 1,200 mg by mouth daily        hydrochlorothiazide 25 MG tablet    HYDRODIURIL    90 tablet    Take 0.5 tablets (12.5 mg) by mouth daily    Essential hypertension, benign       meclizine 25 MG tablet    ANTIVERT    30 tablet    Take 1 tablet (25 mg) by mouth every 6 hours as needed    Vertigo       ondansetron 4 MG tablet    ZOFRAN    18 tablet    Take 1-2 tablets (4-8 mg) by mouth every 8 hours as needed for nausea    Nausea       pravastatin 10 MG tablet    PRAVACHOL    90 tablet    Take 1 tablet (10 mg) by mouth daily    Mixed hyperlipidemia

## 2018-05-04 NOTE — PROGRESS NOTES
"  SUBJECTIVE:   Skye Hewitt is a 68 year old female who presents to clinic today for the following health issues:    Acute Illness   Acute illness concerns: ear  Onset: 4 days ago    Fever: no    Chills/Sweats: no    Headache (location?): no    Sinus Pressure:no    Conjunctivitis:  no    Ear Pain: YES: right    Rhinorrhea: no    Congestion: no    Sore Throat: no     Cough: YES- productive    Wheeze: no    Decreased Appetite: no    Nausea: no    Vomiting: no    Diarrhea:  no    Dysuria/Freq.: no    Fatigue/Achiness: YES- fatigue    Sick/Strep Exposure: no  Returned from [place] yesterday  URI are overall improving   Therapies Tried and outcome: Aspirin     History of sinus infctions.     ROS:  ROS otherwise negative    OBJECTIVE:                                                    /66 (BP Location: Right arm, Cuff Size: Adult Regular)  Pulse 71  Temp 98.1  F (36.7  C) (Oral)  Ht 5' 5\" (1.651 m)  Wt 134 lb (60.8 kg)  LMP 08/25/2010  SpO2 97%  BMI 22.3 kg/m2  Body mass index is 22.3 kg/(m^2).   GENERAL: alert, no distress  HENT: ear canals- normal; TMs- erythemic with effusions bilaterally; Nose- normal; Mouth- no ulcers, no lesions  NECK: no tenderness, no adenopathy  RESP: lungs clear to auscultation - no rales, no rhonchi, no wheezes  CV: regular rates and rhythm, normal S1 S2, no S3 or S4 and no murmur, no click or rub    Diagnostic test results:  No results found for this or any previous visit (from the past 24 hour(s)).     ASSESSMENT/PLAN:                                                    1. Bilateral acute serous otitis media, recurrence not specified  Begin antibiotics.   - amoxicillin (AMOXIL) 875 MG tablet; Take 1 tablet (875 mg) by mouth 2 times daily  Dispense: 20 tablet; Refill: 0    Anil Herrmann PA-C  [facility]  "

## 2018-05-04 NOTE — TELEPHONE ENCOUNTER
Skye Hewitt is a 68 year old female who calls with right ear pain.    NURSING ASSESSMENT:  Description:  Patient states she has stabbing pain below her right ear along the side of her face. States she had a virus earlier in the week and had some discomfort then but it was better yesterday. States she had a low grade fever earlier in the week and feels she may have one now but does not have a working thermometer.   Onset/duration:  Last night  Precip. factors:  na  Associated symptoms:  Throat congestion, nasal drainage  Improves/worsens symptoms:  Heat helps slightly  Pain scale (0-10)   7-9/10  Last exam/Treatment:  10/25/17  Allergies: No Known Allergies    MEDICATIONS:   Taking medication(s) as prescribed? Yes  Taking over the counter medication(s?) Yes-took ASA for discomfort  Any medication side effects? No significant side effects    Any barriers to taking medication(s) as prescribed?  No  Medication(s) improving/managing symptoms?  No  Medication reconciliation completed: No      NURSING PLAN: Nursing advice to patient advised apt due to significant discomfort. Per protocol for earache, patient with fever, congestion or sore throat to be seen with in 24 hours.    RECOMMENDED DISPOSITION:  See in 24 hours - transferred to scheduling  Will comply with recommendation: Yes  If further questions/concerns or if symptoms do not improve, worsen or new symptoms develop, call your PCP or [facility] as soon as possible.      Guideline used:  Telephone Triage Protocols for Nurses, Fourth Edition, Maritza Thomas RN

## 2018-05-17 ENCOUNTER — TRANSFERRED RECORDS (OUTPATIENT)
Dept: HEALTH INFORMATION MANAGEMENT | Facility: CLINIC | Age: 68
End: 2018-05-17

## 2018-08-29 ENCOUNTER — HOSPITAL ENCOUNTER (OUTPATIENT)
Dept: MAMMOGRAPHY | Facility: CLINIC | Age: 68
Discharge: HOME | End: 2018-08-29
Attending: INTERNAL MEDICINE | Admitting: INTERNAL MEDICINE
Payer: MEDICARE

## 2018-08-29 DIAGNOSIS — Z12.31 VISIT FOR SCREENING MAMMOGRAM: ICD-10-CM

## 2018-08-29 PROCEDURE — 77067 SCR MAMMO BI INCL CAD: CPT

## 2019-01-30 ENCOUNTER — TELEPHONE (OUTPATIENT)
Dept: INTERNAL MEDICINE | Facility: CLINIC | Age: 69
End: 2019-01-30

## 2019-01-30 NOTE — LETTER
[facility]  [address], [address]  [place], [place]  [address]                                            TEL:[phone]  FAX:[phone]      Skye Cassandra Hewitt  [address] [place] [address] [address] [address]      January 30, 2019    Dear Skye,       At [facility] we care about your health and well-being.  A review of your chart has indicated that you are due for a colonoscopy and annual physical.  Please contact us at [phone] to schedule an appointment.    If you have already had one or all of the above screening tests at another facility, please call us to update your chart.      Sincerely,      Your [facility] Care Team

## 2019-01-30 NOTE — TELEPHONE ENCOUNTER
Panel Management Review      Patient has the following on her problem list:       IVD   ASA: Passed    Last LDL:    Lab Results   Component Value Date    CHOL 197 10/25/2017     Lab Results   Component Value Date    HDL 88 10/25/2017     Lab Results   Component Value Date    LDL 96 10/25/2017     Lab Results   Component Value Date    TRIG 64 10/25/2017        Lab Results   Component Value Date    CHOLHDLRATIO 3.2 09/03/2015        Is the patient on a Statin? YES   Is the patient on Aspirin? YES                  Medications     HMG CoA Reductase Inhibitors    pravastatin (PRAVACHOL) 10 MG tablet    Salicylates    aspirin 81 MG tablet          Last three blood pressure readings:  BP Readings from Last 3 Encounters:   05/04/18 116/66   10/25/17 118/74   08/11/17 114/70        Tobacco History:     History   Smoking Status     Never Smoker   Smokeless Tobacco     Never Used     Comment: smoked for 8 months       Hypertension   Last three blood pressure readings:  BP Readings from Last 3 Encounters:   05/04/18 116/66   10/25/17 118/74   08/11/17 114/70     Blood pressure: Passed    HTN Guidelines:  Age 18-59 BP range:  Less than 140/90  Age 60-85 with Diabetes:  Less than 140/90  Age 60-85 without Diabetes:  less than 150/90      Composite cancer screening  Chart review shows that this patient is due/due soon for the following Colonoscopy  Summary:    Patient is due/failing the following:   COLONOSCOPY and PHYSICAL    Action needed:   Patient needs office visit for physical.    Type of outreach:    Sent letter.    Questions for provider review:    None                                                                                                                                    [facility]       Chart routed to closed .

## 2019-09-29 ENCOUNTER — HEALTH MAINTENANCE LETTER (OUTPATIENT)
Age: 69
End: 2019-09-29

## 2020-03-15 ENCOUNTER — HEALTH MAINTENANCE LETTER (OUTPATIENT)
Age: 70
End: 2020-03-15

## 2021-01-14 ENCOUNTER — HEALTH MAINTENANCE LETTER (OUTPATIENT)
Age: 71
End: 2021-01-14

## 2021-05-08 ENCOUNTER — HEALTH MAINTENANCE LETTER (OUTPATIENT)
Age: 71
End: 2021-05-08

## 2021-10-23 ENCOUNTER — HEALTH MAINTENANCE LETTER (OUTPATIENT)
Age: 71
End: 2021-10-23

## 2022-06-04 ENCOUNTER — HEALTH MAINTENANCE LETTER (OUTPATIENT)
Age: 72
End: 2022-06-04

## 2022-10-10 ENCOUNTER — HEALTH MAINTENANCE LETTER (OUTPATIENT)
Age: 72
End: 2022-10-10

## 2023-06-11 ENCOUNTER — HEALTH MAINTENANCE LETTER (OUTPATIENT)
Age: 73
End: 2023-06-11